# Patient Record
Sex: MALE | Race: WHITE | NOT HISPANIC OR LATINO | Employment: OTHER | URBAN - METROPOLITAN AREA
[De-identification: names, ages, dates, MRNs, and addresses within clinical notes are randomized per-mention and may not be internally consistent; named-entity substitution may affect disease eponyms.]

---

## 2020-01-29 ENCOUNTER — TELEPHONE (OUTPATIENT)
Dept: NEUROSURGERY | Facility: CLINIC | Age: 70
End: 2020-01-29

## 2020-01-29 DIAGNOSIS — S22.069S CLOSED FRACTURE OF SEVENTH THORACIC VERTEBRA, UNSPECIFIED FRACTURE MORPHOLOGY, SEQUELA: Primary | ICD-10-CM

## 2020-01-29 NOTE — TELEPHONE ENCOUNTER
Patient currently admitted. Will receive information regarding scheduled follow up upon discharge.

## 2020-01-29 NOTE — TELEPHONE ENCOUNTER
----- Message from Deedee Cohn NP sent at 1/29/2020  3:27 PM CST -----  He needs an appt in 4 weeks with upright thoracic x-rays ap/lat prior to appt. Thanks! Dx T7 vertebral fracture.

## 2020-02-24 ENCOUNTER — OFFICE VISIT (OUTPATIENT)
Dept: NEUROSURGERY | Facility: CLINIC | Age: 70
End: 2020-02-24
Payer: MEDICARE

## 2020-02-24 ENCOUNTER — HOSPITAL ENCOUNTER (OUTPATIENT)
Dept: RADIOLOGY | Facility: HOSPITAL | Age: 70
Discharge: HOME | End: 2020-02-24
Attending: NEUROLOGICAL SURGERY
Payer: MEDICARE

## 2020-02-24 VITALS
HEIGHT: 75 IN | BODY MASS INDEX: 34.54 KG/M2 | TEMPERATURE: 98 F | SYSTOLIC BLOOD PRESSURE: 116 MMHG | DIASTOLIC BLOOD PRESSURE: 76 MMHG | WEIGHT: 277.75 LBS

## 2020-02-24 DIAGNOSIS — G20.C PARKINSONISM, UNSPECIFIED PARKINSONISM TYPE: ICD-10-CM

## 2020-02-24 DIAGNOSIS — S22.069D CLOSED FRACTURE OF SEVENTH THORACIC VERTEBRA WITH ROUTINE HEALING, UNSPECIFIED FRACTURE MORPHOLOGY, SUBSEQUENT ENCOUNTER: ICD-10-CM

## 2020-02-24 DIAGNOSIS — S22.069S CLOSED FRACTURE OF SEVENTH THORACIC VERTEBRA, UNSPECIFIED FRACTURE MORPHOLOGY, SEQUELA: ICD-10-CM

## 2020-02-24 PROCEDURE — 72070 X-RAY EXAM THORAC SPINE 2VWS: CPT | Mod: TC,FY,PO

## 2020-02-24 PROCEDURE — 72070 XR THORACIC SPINE AP LATERAL: ICD-10-PCS | Mod: 26,,, | Performed by: RADIOLOGY

## 2020-02-24 PROCEDURE — 99999 PR PBB SHADOW E&M-EST. PATIENT-LVL IV: CPT | Mod: PBBFAC,,, | Performed by: NEUROLOGICAL SURGERY

## 2020-02-24 PROCEDURE — 99214 PR OFFICE/OUTPT VISIT, EST, LEVL IV, 30-39 MIN: ICD-10-PCS | Mod: S$PBB,,, | Performed by: NEUROLOGICAL SURGERY

## 2020-02-24 PROCEDURE — 99999 PR PBB SHADOW E&M-EST. PATIENT-LVL IV: ICD-10-PCS | Mod: PBBFAC,,, | Performed by: NEUROLOGICAL SURGERY

## 2020-02-24 PROCEDURE — 99214 OFFICE O/P EST MOD 30 MIN: CPT | Mod: S$PBB,,, | Performed by: NEUROLOGICAL SURGERY

## 2020-02-24 PROCEDURE — 72070 X-RAY EXAM THORAC SPINE 2VWS: CPT | Mod: 26,,, | Performed by: RADIOLOGY

## 2020-02-24 PROCEDURE — 99214 OFFICE O/P EST MOD 30 MIN: CPT | Mod: PBBFAC,25,PN | Performed by: NEUROLOGICAL SURGERY

## 2020-02-24 NOTE — PROGRESS NOTES
Neurosurgery History and Physical    Patient ID: Oliva Brown is a 69 y.o. male.    Chief Complaint   Patient presents with    Follow-up     4 week follow-up T7, T8 compression fractures. Pain 5/6       Review of Systems   Constitutional: Negative.    HENT: Negative.    Eyes: Negative.    Respiratory: Negative.    Cardiovascular: Negative.    Gastrointestinal: Negative.    Endocrine: Negative.    Genitourinary: Negative.    Musculoskeletal: Positive for arthralgias and back pain.   Skin: Negative.    Allergic/Immunologic: Negative.    Neurological: Positive for tremors. Negative for weakness.   Hematological: Negative.    Psychiatric/Behavioral: Negative.        No past medical history on file.  Social History     Socioeconomic History    Marital status:      Spouse name: Not on file    Number of children: Not on file    Years of education: Not on file    Highest education level: Not on file   Occupational History    Not on file   Social Needs    Financial resource strain: Not on file    Food insecurity:     Worry: Not on file     Inability: Not on file    Transportation needs:     Medical: Not on file     Non-medical: Not on file   Tobacco Use    Smoking status: Former Smoker   Substance and Sexual Activity    Alcohol use: Not on file    Drug use: Not on file    Sexual activity: Not on file   Lifestyle    Physical activity:     Days per week: Not on file     Minutes per session: Not on file    Stress: Not on file   Relationships    Social connections:     Talks on phone: Not on file     Gets together: Not on file     Attends Christian service: Not on file     Active member of club or organization: Not on file     Attends meetings of clubs or organizations: Not on file     Relationship status: Not on file   Other Topics Concern    Not on file   Social History Narrative    Not on file     No family history on file.  Review of patient's allergies indicates:   Allergen Reactions    Pradaxa  [dabigatran etexilate]        Current Outpatient Medications:     adalimumab (HUMIRA PEN SUBQ), Inject 40 mg into the skin every 14 (fourteen) days. ordered per Dr Aguila Alatorre (not in epic), Disp: , Rfl:     ALPRAZolam (XANAX) 1 MG tablet, Take 1 mg by mouth 3 (three) times daily. ordered per Dr Fowler (psych) not in epic, Disp: , Rfl:     ascorbic acid, vitamin C, (VITAMIN C) 1000 MG tablet, Take 1,000 mg by mouth once daily., Disp: , Rfl:     aspirin (ECOTRIN) 81 MG EC tablet, Take 81 mg by mouth once daily. ordered per Dr Jose Carver (cardiologist) not in epic, Disp: , Rfl:     b complex vitamins (B COMPLEX-VITAMIN B12) tablet, Take 1 tablet by mouth 2 (two) times daily., Disp: , Rfl:     calcium carbonate-vitamin D3 (CALTRATE WITH VITAMIN D3) 600 mg(1,500mg) -800 unit Tab, Take 1 tablet by mouth once daily., Disp: , Rfl:     DULCOLAX, BISACODYL, ORAL, Take 100 mg by mouth 2 (two) times daily., Disp: , Rfl:     DULoxetine 60 mg CDRS, Take 60 mg by mouth every morning. Dr Fowler (not in [facility]) psychiatrist, Disp: , Rfl:     folic acid (FOLVITE) 1 MG tablet, Take 1 mg by mouth every morning. Dr Aguila Alatorre (dermatologist) not in epic, Disp: , Rfl:     furosemide (LASIX) 20 MG tablet, Take 20 mg by mouth every morning. 2 tabs = 40mg  ordered per Dr Jose Carver (cardioligist) not in epic, Disp: , Rfl:     gabapentin (NEURONTIN) 300 MG capsule, Take 300 mg by mouth 3 (three) times daily., Disp: , Rfl:     lamoTRIgine (LAMICTAL) 25 MG tablet, Take 25 mg by mouth 2 (two) times daily. ordered per Dr Fowler (psych) not in epic, Disp: , Rfl:     levothyroxine (SYNTHROID) 200 MCG tablet, Take 200 mcg by mouth once daily., Disp: , Rfl:     lurasidone (LATUDA) 60 mg Tab tablet, Take 60 mg by mouth every evening. ordered per Dr Johnson (psych) not in epic, Disp: , Rfl:     METHOTREXATE, BULK, MISC, Take 2.5 mg by mouth once a week. ordered per Dr Ian Alatorre (not in epic)   "8 tabs every Thursday = 20mg, Disp: , Rfl:     metoprolol succinate (TOPROL XL) 25 MG 24 hr tablet, Take 25 mg by mouth every evening. Jose Carver (cardiologist) not in epic, Disp: , Rfl:     multivit-min/FA/lycopen/lutein (CENTRUM SILVER MEN ORAL), Take 1 tablet by mouth once daily. ordered per Dr Flex Devine PCP (not in epic), Disp: , Rfl:     oxyCODONE-acetaminophen (PERCOCET)  mg per tablet, Take 1 tablet by mouth every 6 (six) hours as needed for Pain (pain >5 on pain scale)., Disp: , Rfl:     polyethylene glycol (MIRALAX) 17 gram/dose powder, Take 17 g by mouth once daily. mix in 4-8 oz liquid of choice, Disp: , Rfl:     potassium 99 mg Tab, Take 1 tablet by mouth once daily., Disp: , Rfl:     rosuvastatin (CRESTOR) 20 MG tablet, Take 20 mg by mouth every evening., Disp: , Rfl:     SLO-NIACIN ORAL, Take 500 mg by mouth once daily., Disp: , Rfl:     tiZANidine (ZANAFLEX) 4 MG tablet, Take 2 mg by mouth 3 (three) times daily. takes 1/2 tab 3 x daily, Disp: , Rfl:     traZODone (DESYREL) 150 MG tablet, Take 150 mg by mouth every evening. ordered per Dr Fowler ( psych) not in epic, Disp: , Rfl:     OXcarbazepine (TRILEPTAL) 300 MG Tab, Take 300 mg by mouth 3 (three) times daily. Dr Cason (psych) not in epic, Disp: , Rfl:   Blood pressure 116/76, temperature 97.9 °F (36.6 °C), height 6' 3" (1.905 m), weight 126 kg (277 lb 12.5 oz).      Neurologic Exam     Mental Status   Oriented to person, place, and time.   Attention: normal. Concentration: normal.   Speech: speech is normal   Level of consciousness: alert  Knowledge: good.     Cranial Nerves     CN II   Visual acuity: normal    CN III, IV, VI   Pupils are equal, round, and reactive to light.  Extraocular motions are normal.     CN V   Facial sensation intact.     CN VII   Facial expression full, symmetric.     CN VIII   Hearing: intact    CN IX, X   Palate: symmetric    CN XI   CN XI normal.     CN XII   CN XII normal.     Motor " Exam   Muscle bulk: normal  Overall muscle tone: normal  Right arm pronator drift: absent  Left arm pronator drift: absent    Strength   Right deltoid: 5/5  Left deltoid: 5/5  Right biceps: 5/5  Left biceps: 5/5  Right triceps: 5/5  Left triceps: 5/5  Right wrist flexion: 5/5  Left wrist flexion: 5/5  Right wrist extension: 5/5  Left wrist extension: 5/5  Right interossei: 5/5  Left interossei: 5/5  Right iliopsoas: 5/5  Left iliopsoas: 5/5  Right quadriceps: 5/5  Left quadriceps: 5/5  Right hamstrin/5  Left hamstrin/5  Right anterior tibial: 5/5  Left anterior tibial: 5/5  Right posterior tibial: 5/5  Left posterior tibial: 5/5  Right peroneal: 5/5  Left peroneal: 5/5  Right gastroc: 5/5  Left gastroc: 5/5    Sensory Exam   Light touch normal.     Gait, Coordination, and Reflexes     Gait  Gait: shuffling    Coordination   Romberg: negative  Finger to nose coordination: normal    Tremor   Resting tremor: present    Reflexes   Right brachioradialis: 2+  Left brachioradialis: 2+  Right biceps: 2+  Left biceps: 2+  Right triceps: 2+  Left triceps: 2+  Right patellar: 0  Left patellar: 1+  Right achilles: 0  Left achilles: 0  Right plantar: normal  Left plantar: normal  Right Patel: absent  Left Patel: absent  Right ankle clonus: absent  Left ankle clonus: absent      Physical Exam   Constitutional: He is oriented to person, place, and time. He appears well-developed and well-nourished.   HENT:   Head: Normocephalic and atraumatic.   Eyes: Pupils are equal, round, and reactive to light. EOM are normal.   Neck: Normal range of motion. Neck supple.   Cardiovascular: Normal rate and regular rhythm.   Pulmonary/Chest: Effort normal.   Abdominal: Soft.   Musculoskeletal: Normal range of motion.   Neurological: He is alert and oriented to person, place, and time. He has a normal Finger-Nose-Finger Test and a normal Romberg Test.   Reflex Scores:       Tricep reflexes are 2+ on the right side and 2+ on the left  "side.       Bicep reflexes are 2+ on the right side and 2+ on the left side.       Brachioradialis reflexes are 2+ on the right side and 2+ on the left side.       Patellar reflexes are 0 on the right side and 1+ on the left side.       Achilles reflexes are 0 on the right side and 0 on the left side.  Skin: Skin is warm and dry.   Psychiatric: He has a normal mood and affect. His speech is normal and behavior is normal. Judgment and thought content normal.   Nursing note and vitals reviewed.      Vital Signs  Temp: 97.9 °F (36.6 °C)  BP: 116/76  Pain Score:   6  Height and Weight  Height: 6' 3" (190.5 cm)  Weight: 126 kg (277 lb 12.5 oz)  BSA (Calculated - sq m): 2.58 sq meters  BMI (Calculated): 34.7  Weight in (lb) to have BMI = 25: 199.6]    Provider dictation:  I reviewed the imaging.  Spinal alignment is maintained and the fracture is actually not visualized on the standing x-ray.    He reports significant improvement in his back pain.  He denies any new weakness or numbness.  He has chronic bladder incontinence which has not changed since the injury.  As a separate issue, he describes progressively worsening tremors.    His neurological exam is negative for any signs of thoracic myelopathy but is significant for a MAST facies and a low-frequency resting tremor in all 4 extremities.  He also has a shuffling gait.    Continue wearing the brace for another 4 weeks.  I suspect that he has undiagnosed parkinsonism and I will refer him to Neurology for further evaluation.  He is established with a spine surgeon in [place] whom he sees on a regular basis.  He may follow up with his spine surgeon regarding any further issues with his thoracic spine injury.    Visit Diagnosis:  Closed fracture of seventh thoracic vertebra with routine healing, unspecified fracture morphology, subsequent encounter    Parkinsonism, unspecified Parkinsonism type      "

## 2020-02-26 ENCOUNTER — TELEPHONE (OUTPATIENT)
Dept: NEUROSURGERY | Facility: CLINIC | Age: 70
End: 2020-02-26

## 2020-02-26 DIAGNOSIS — G20.C PARKINSONISM, UNSPECIFIED PARKINSONISM TYPE: Primary | ICD-10-CM

## 2020-02-26 NOTE — TELEPHONE ENCOUNTER
Hi! Patient was seen in clinic this week by Dr. Alexandre and he would like patient to be seen for evaluation of tremors.Please call to schedule when convenient. Thanks!

## 2022-05-05 ENCOUNTER — PATIENT MESSAGE (OUTPATIENT)
Dept: RESEARCH | Facility: HOSPITAL | Age: 72
End: 2022-05-05
Payer: MEDICARE